# Patient Record
Sex: MALE
[De-identification: names, ages, dates, MRNs, and addresses within clinical notes are randomized per-mention and may not be internally consistent; named-entity substitution may affect disease eponyms.]

---

## 2019-07-05 PROBLEM — Z00.00 ENCOUNTER FOR PREVENTIVE HEALTH EXAMINATION: Status: ACTIVE | Noted: 2019-07-05

## 2019-07-12 ENCOUNTER — APPOINTMENT (OUTPATIENT)
Dept: ORTHOPEDIC SURGERY | Facility: CLINIC | Age: 74
End: 2019-07-12
Payer: MEDICARE

## 2019-07-12 VITALS
WEIGHT: 168 LBS | HEIGHT: 67.72 IN | DIASTOLIC BLOOD PRESSURE: 101 MMHG | SYSTOLIC BLOOD PRESSURE: 214 MMHG | HEART RATE: 54 BPM | BODY MASS INDEX: 25.76 KG/M2

## 2019-07-12 DIAGNOSIS — M54.16 RADICULOPATHY, LUMBAR REGION: ICD-10-CM

## 2019-07-12 DIAGNOSIS — M48.07 SPINAL STENOSIS, LUMBOSACRAL REGION: ICD-10-CM

## 2019-07-12 PROCEDURE — 99203 OFFICE O/P NEW LOW 30 MIN: CPT

## 2019-07-12 NOTE — DISCUSSION/SUMMARY
[de-identified] : He is tried physical therapy and medications without relief of his symptoms. We discussed the role of surgery in the form of lumbar decompression. He like to be evaluated for possible epidural injections. She will be referred for this. Follow up afterwards.

## 2019-07-12 NOTE — HISTORY OF PRESENT ILLNESS
[de-identified] : Patient c/o sharp lower back pain with radiation down both legs anteriorly and posteriorly x 2 months. Pain started intermittently but is now constant. Pain started insidiously.\par Did PT 1 month ago as per PCP (Dr. Bob Alvarez) which did not help. \par Was also prescribed a medicine for pain, but he cannot recall the name of the medication currently.\par Denies bowel/bladder dysfunction or saddle anesthesia.\par

## 2019-07-12 NOTE — PHYSICAL EXAM
[Antalgic] : antalgic [Ataxic] : not ataxic [de-identified] : Examination of the lumbar spine reveals no midline tenderness palpation, step-offs, or skin lesions. Decreased range of motion with respect to flexion, extension, lateral bending, and rotation. No tenderness to palpation of the sciatic notch. No tenderness palpation of the bilateral greater trochanters. No pain with passive internal/external rotation of the hips. No instability of bilateral lower extremities.  Negative MARLO. Negative straight leg raise bilaterally. No bowstring. Negative femoral stretch. 5 out of 5 iliopsoas, hip abductors, hips adductors, quadriceps, hamstrings, gastrocsoleus, tibialis anterior, extensor hallucis longus, peroneals. Grossly intact sensation to light touch bilateral lower extremities. 1+ patellar and Achilles reflexes. Downgoing Babinski. No clonus. Intact proprioception. Palpable pulses. No skin lesion and no edema on the right and left lower extremities.\par No upper extremity hyperreflexia or long track signs [de-identified] : Had MRI done of lumbar spine at [facility] in [place] DOS 6/5/19 showing multilevel degenerative disc disease and disc bulges at L1-2, L2-3, L3-4, L4-5, and L5-S1 along with central canal stenosis at L3-4 and L4-5.